# Patient Record
Sex: MALE | Race: WHITE | Employment: FULL TIME | URBAN - METROPOLITAN AREA
[De-identification: names, ages, dates, MRNs, and addresses within clinical notes are randomized per-mention and may not be internally consistent; named-entity substitution may affect disease eponyms.]

---

## 2019-10-10 ENCOUNTER — OFFICE VISIT (OUTPATIENT)
Dept: FAMILY MEDICINE CLINIC | Age: 29
End: 2019-10-10
Payer: MEDICAID

## 2019-10-10 VITALS
BODY MASS INDEX: 25.05 KG/M2 | OXYGEN SATURATION: 97 % | WEIGHT: 189 LBS | TEMPERATURE: 97.8 F | HEIGHT: 73 IN | SYSTOLIC BLOOD PRESSURE: 100 MMHG | DIASTOLIC BLOOD PRESSURE: 68 MMHG | HEART RATE: 85 BPM

## 2019-10-10 DIAGNOSIS — F41.8 SITUATIONAL ANXIETY: ICD-10-CM

## 2019-10-10 DIAGNOSIS — F42.9 OBSESSIVE-COMPULSIVE DISORDER, UNSPECIFIED TYPE: ICD-10-CM

## 2019-10-10 DIAGNOSIS — Z76.89 ENCOUNTER TO ESTABLISH CARE: Primary | ICD-10-CM

## 2019-10-10 DIAGNOSIS — Z76.0 MEDICATION REFILL: ICD-10-CM

## 2019-10-10 DIAGNOSIS — E10.69 TYPE 1 DIABETES MELLITUS WITH OTHER SPECIFIED COMPLICATION (HCC): ICD-10-CM

## 2019-10-10 PROCEDURE — 90472 IMMUNIZATION ADMIN EACH ADD: CPT | Performed by: FAMILY MEDICINE

## 2019-10-10 PROCEDURE — 99203 OFFICE O/P NEW LOW 30 MIN: CPT | Performed by: FAMILY MEDICINE

## 2019-10-10 PROCEDURE — 90686 IIV4 VACC NO PRSV 0.5 ML IM: CPT | Performed by: FAMILY MEDICINE

## 2019-10-10 PROCEDURE — 3046F HEMOGLOBIN A1C LEVEL >9.0%: CPT | Performed by: FAMILY MEDICINE

## 2019-10-10 PROCEDURE — 90715 TDAP VACCINE 7 YRS/> IM: CPT | Performed by: FAMILY MEDICINE

## 2019-10-10 PROCEDURE — 90471 IMMUNIZATION ADMIN: CPT | Performed by: FAMILY MEDICINE

## 2019-10-10 PROCEDURE — 4004F PT TOBACCO SCREEN RCVD TLK: CPT | Performed by: FAMILY MEDICINE

## 2019-10-10 PROCEDURE — G8427 DOCREV CUR MEDS BY ELIG CLIN: HCPCS | Performed by: FAMILY MEDICINE

## 2019-10-10 PROCEDURE — G8482 FLU IMMUNIZE ORDER/ADMIN: HCPCS | Performed by: FAMILY MEDICINE

## 2019-10-10 PROCEDURE — 2022F DILAT RTA XM EVC RTNOPTHY: CPT | Performed by: FAMILY MEDICINE

## 2019-10-10 PROCEDURE — G8420 CALC BMI NORM PARAMETERS: HCPCS | Performed by: FAMILY MEDICINE

## 2019-10-10 RX ORDER — INSULIN GLARGINE 100 [IU]/ML
40 INJECTION, SOLUTION SUBCUTANEOUS NIGHTLY
Refills: 0 | COMMUNITY
Start: 2019-09-26 | End: 2019-10-10 | Stop reason: SDUPTHER

## 2019-10-10 RX ORDER — INSULIN ASPART 100 [IU]/ML
1 INJECTION, SOLUTION INTRAVENOUS; SUBCUTANEOUS
COMMUNITY
Start: 2019-10-10 | End: 2019-10-10 | Stop reason: SDUPTHER

## 2019-10-10 RX ORDER — DIAPER,BRIEF,ADULT, DISPOSABLE
EACH MISCELLANEOUS
COMMUNITY
Start: 2019-09-16 | End: 2020-01-21 | Stop reason: SDUPTHER

## 2019-10-10 RX ORDER — PROPRANOLOL HYDROCHLORIDE 20 MG/1
20 TABLET ORAL DAILY PRN
Qty: 30 TABLET | Refills: 0 | Status: SHIPPED | OUTPATIENT
Start: 2019-10-10 | End: 2020-01-21

## 2019-10-10 RX ORDER — DULOXETIN HYDROCHLORIDE 30 MG/1
30 CAPSULE, DELAYED RELEASE ORAL DAILY
COMMUNITY
Start: 2019-09-19 | End: 2019-10-10

## 2019-10-10 RX ORDER — SERTRALINE HYDROCHLORIDE 25 MG/1
25 TABLET, FILM COATED ORAL DAILY
Qty: 7 TABLET | Refills: 0 | Status: SHIPPED | OUTPATIENT
Start: 2019-10-10 | End: 2020-01-21

## 2019-10-10 RX ORDER — PROPRANOLOL HYDROCHLORIDE 40 MG/1
40 TABLET ORAL DAILY PRN
Qty: 30 TABLET | Refills: 3 | Status: CANCELLED | OUTPATIENT
Start: 2019-10-10

## 2019-10-10 ASSESSMENT — PATIENT HEALTH QUESTIONNAIRE - PHQ9
SUM OF ALL RESPONSES TO PHQ9 QUESTIONS 1 & 2: 0
1. LITTLE INTEREST OR PLEASURE IN DOING THINGS: 0
SUM OF ALL RESPONSES TO PHQ QUESTIONS 1-9: 0
SUM OF ALL RESPONSES TO PHQ QUESTIONS 1-9: 0
2. FEELING DOWN, DEPRESSED OR HOPELESS: 0

## 2019-10-10 ASSESSMENT — ENCOUNTER SYMPTOMS
DIARRHEA: 1
BACK PAIN: 0
SHORTNESS OF BREATH: 0
COUGH: 0
WHEEZING: 0
ABDOMINAL PAIN: 0

## 2020-01-15 ENCOUNTER — TELEPHONE (OUTPATIENT)
Dept: FAMILY MEDICINE CLINIC | Age: 30
End: 2020-01-15

## 2020-01-15 NOTE — TELEPHONE ENCOUNTER
Pt left  msg saying that he is looking for Lantus in a vial or is asking if there is something cheaper the doctor can substitute since the patient doesn't have insurance.

## 2020-01-17 RX ORDER — INSULIN GLARGINE 100 [IU]/ML
40 INJECTION, SOLUTION SUBCUTANEOUS NIGHTLY
Qty: 5 VIAL | Refills: 2 | Status: SHIPPED | OUTPATIENT
Start: 2020-01-17 | End: 2020-01-21 | Stop reason: SDUPTHER

## 2020-01-21 ENCOUNTER — OFFICE VISIT (OUTPATIENT)
Dept: FAMILY MEDICINE CLINIC | Age: 30
End: 2020-01-21
Payer: COMMERCIAL

## 2020-01-21 ENCOUNTER — TELEPHONE (OUTPATIENT)
Dept: FAMILY MEDICINE CLINIC | Age: 30
End: 2020-01-21

## 2020-01-21 ENCOUNTER — HOSPITAL ENCOUNTER (OUTPATIENT)
Age: 30
Discharge: HOME | End: 2020-01-23
Payer: COMMERCIAL

## 2020-01-21 VITALS
BODY MASS INDEX: 25.31 KG/M2 | HEART RATE: 90 BPM | OXYGEN SATURATION: 98 % | TEMPERATURE: 97.6 F | DIASTOLIC BLOOD PRESSURE: 78 MMHG | WEIGHT: 191 LBS | HEIGHT: 73 IN | SYSTOLIC BLOOD PRESSURE: 122 MMHG

## 2020-01-21 LAB
ALBUMIN SERPL-MCNC: 4.4 G/DL (ref 3.5–5.2)
ALP BLD-CCNC: 66 U/L (ref 40–129)
ALT SERPL-CCNC: 11 U/L (ref 0–40)
ANION GAP SERPL CALCULATED.3IONS-SCNC: 19 MMOL/L (ref 7–16)
AST SERPL-CCNC: 13 U/L (ref 0–39)
BASOPHILS ABSOLUTE: 0.05 E9/L (ref 0–0.2)
BASOPHILS RELATIVE PERCENT: 0.6 % (ref 0–2)
BILIRUB SERPL-MCNC: 0.3 MG/DL (ref 0–1.2)
BUN BLDV-MCNC: 8 MG/DL (ref 6–20)
CALCIUM SERPL-MCNC: 9.6 MG/DL (ref 8.6–10.2)
CHLORIDE BLD-SCNC: 103 MMOL/L (ref 98–107)
CHOLESTEROL, TOTAL: 131 MG/DL (ref 0–199)
CO2: 20 MMOL/L (ref 22–29)
CREAT SERPL-MCNC: 0.7 MG/DL (ref 0.7–1.2)
EOSINOPHILS ABSOLUTE: 0.22 E9/L (ref 0.05–0.5)
EOSINOPHILS RELATIVE PERCENT: 2.5 % (ref 0–6)
GFR AFRICAN AMERICAN: >60
GFR NON-AFRICAN AMERICAN: >60 ML/MIN/1.73
GLUCOSE BLD-MCNC: 58 MG/DL (ref 74–99)
HBA1C MFR BLD: 7.7 % (ref 4–5.6)
HCT VFR BLD CALC: 46.2 % (ref 37–54)
HDLC SERPL-MCNC: 47 MG/DL
HEMOGLOBIN: 15.2 G/DL (ref 12.5–16.5)
IMMATURE GRANULOCYTES #: 0.02 E9/L
IMMATURE GRANULOCYTES %: 0.2 % (ref 0–5)
LDL CHOLESTEROL CALCULATED: 58 MG/DL (ref 0–99)
LYMPHOCYTES ABSOLUTE: 2.34 E9/L (ref 1.5–4)
LYMPHOCYTES RELATIVE PERCENT: 26.9 % (ref 20–42)
MCH RBC QN AUTO: 30.8 PG (ref 26–35)
MCHC RBC AUTO-ENTMCNC: 32.9 % (ref 32–34.5)
MCV RBC AUTO: 93.5 FL (ref 80–99.9)
MONOCYTES ABSOLUTE: 0.62 E9/L (ref 0.1–0.95)
MONOCYTES RELATIVE PERCENT: 7.1 % (ref 2–12)
NEUTROPHILS ABSOLUTE: 5.44 E9/L (ref 1.8–7.3)
NEUTROPHILS RELATIVE PERCENT: 62.7 % (ref 43–80)
PDW BLD-RTO: 12.2 FL (ref 11.5–15)
PLATELET # BLD: 353 E9/L (ref 130–450)
PMV BLD AUTO: 11 FL (ref 7–12)
POTASSIUM SERPL-SCNC: 4.1 MMOL/L (ref 3.5–5)
RBC # BLD: 4.94 E12/L (ref 3.8–5.8)
SODIUM BLD-SCNC: 142 MMOL/L (ref 132–146)
T4 FREE: 1.3 NG/DL (ref 0.93–1.7)
TOTAL PROTEIN: 7.4 G/DL (ref 6.4–8.3)
TRIGL SERPL-MCNC: 129 MG/DL (ref 0–149)
TSH SERPL DL<=0.05 MIU/L-ACNC: 0.93 UIU/ML (ref 0.27–4.2)
VLDLC SERPL CALC-MCNC: 26 MG/DL
WBC # BLD: 8.7 E9/L (ref 4.5–11.5)

## 2020-01-21 PROCEDURE — 84439 ASSAY OF FREE THYROXINE: CPT

## 2020-01-21 PROCEDURE — 99213 OFFICE O/P EST LOW 20 MIN: CPT | Performed by: FAMILY MEDICINE

## 2020-01-21 PROCEDURE — 85025 COMPLETE CBC W/AUTO DIFF WBC: CPT

## 2020-01-21 PROCEDURE — 80053 COMPREHEN METABOLIC PANEL: CPT

## 2020-01-21 PROCEDURE — 84443 ASSAY THYROID STIM HORMONE: CPT

## 2020-01-21 PROCEDURE — 80061 LIPID PANEL: CPT

## 2020-01-21 PROCEDURE — 36415 COLL VENOUS BLD VENIPUNCTURE: CPT

## 2020-01-21 PROCEDURE — 83036 HEMOGLOBIN GLYCOSYLATED A1C: CPT

## 2020-01-21 RX ORDER — BUSPIRONE HYDROCHLORIDE 10 MG/1
10 TABLET ORAL 3 TIMES DAILY
Qty: 90 TABLET | Refills: 0 | Status: SHIPPED
Start: 2020-01-21 | End: 2020-02-25 | Stop reason: SDUPTHER

## 2020-01-21 RX ORDER — GLUCOSAMINE HCL/CHONDROITIN SU 500-400 MG
CAPSULE ORAL
Qty: 100 STRIP | Refills: 5 | Status: SHIPPED
Start: 2020-01-21 | End: 2020-02-06 | Stop reason: SDUPTHER

## 2020-01-21 RX ORDER — BLOOD-GLUCOSE METER
1 KIT MISCELLANEOUS DAILY
Qty: 1 KIT | Refills: 0 | Status: SHIPPED | OUTPATIENT
Start: 2020-01-21

## 2020-01-21 ASSESSMENT — PATIENT HEALTH QUESTIONNAIRE - PHQ9
SUM OF ALL RESPONSES TO PHQ QUESTIONS 1-9: 0
SUM OF ALL RESPONSES TO PHQ QUESTIONS 1-9: 0
SUM OF ALL RESPONSES TO PHQ9 QUESTIONS 1 & 2: 0
2. FEELING DOWN, DEPRESSED OR HOPELESS: 0
1. LITTLE INTEREST OR PLEASURE IN DOING THINGS: 0

## 2020-01-21 ASSESSMENT — ENCOUNTER SYMPTOMS
SHORTNESS OF BREATH: 0
BACK PAIN: 0
WHEEZING: 0
ABDOMINAL PAIN: 0
COUGH: 0

## 2020-01-21 NOTE — TELEPHONE ENCOUNTER
Pharmacy called saying they'll need maximum daily dose for insurance--for his novolog flex pen. Please advise.

## 2020-02-06 ENCOUNTER — OFFICE VISIT (OUTPATIENT)
Dept: FAMILY MEDICINE CLINIC | Age: 30
End: 2020-02-06
Payer: COMMERCIAL

## 2020-02-06 VITALS
BODY MASS INDEX: 25.58 KG/M2 | SYSTOLIC BLOOD PRESSURE: 116 MMHG | HEART RATE: 71 BPM | HEIGHT: 73 IN | OXYGEN SATURATION: 98 % | DIASTOLIC BLOOD PRESSURE: 70 MMHG | WEIGHT: 193 LBS | TEMPERATURE: 97.5 F

## 2020-02-06 PROBLEM — F41.9 ANXIETY AND DEPRESSION: Status: ACTIVE | Noted: 2020-02-06

## 2020-02-06 PROBLEM — Z76.0 MEDICATION REFILL: Status: ACTIVE | Noted: 2020-02-06

## 2020-02-06 PROBLEM — F32.A ANXIETY AND DEPRESSION: Status: ACTIVE | Noted: 2020-02-06

## 2020-02-06 PROBLEM — E10.69 TYPE 1 DIABETES MELLITUS WITH OTHER SPECIFIED COMPLICATION (HCC): Status: ACTIVE | Noted: 2020-02-06

## 2020-02-06 PROCEDURE — 99213 OFFICE O/P EST LOW 20 MIN: CPT | Performed by: FAMILY MEDICINE

## 2020-02-06 PROCEDURE — 3051F HG A1C>EQUAL 7.0%<8.0%: CPT | Performed by: FAMILY MEDICINE

## 2020-02-06 RX ORDER — GLUCOSAMINE HCL/CHONDROITIN SU 500-400 MG
CAPSULE ORAL
Qty: 100 STRIP | Refills: 5 | Status: SHIPPED
Start: 2020-02-06 | End: 2020-02-25 | Stop reason: SDUPTHER

## 2020-02-06 ASSESSMENT — ENCOUNTER SYMPTOMS
SHORTNESS OF BREATH: 0
BACK PAIN: 0
COUGH: 0
ABDOMINAL PAIN: 0
WHEEZING: 0

## 2020-02-06 NOTE — PROGRESS NOTES
Deandra Batres   Patient is a 30y.o. year old male who presents with:  Chief Complaint   Patient presents with    Follow-up   [address]     Completed on 1-     Patient also follows with: none    HPI    DM type I  Current treatment: lantus 40 units nightly, insulin aspart with meals 1unit/10g carbohydrates    Recent changes in medications: none  Estimates he uses an average of 25 units of novolog per day. The patient checks home blood glucose: four times per day  Recent blood glucose readings: poorly controlled and labile. Lab Results   Component Value Date    LABA1C 7.7 (H) 01/21/2020       OCD/situational anxiety  Current treatment BuSpar 10 mg 3 times daily  Recent changes in medication: Current treatment was started last visit  Reports symptoms were significantly improved the first 1-2 weeks after beginning treatment  Improvement has waned to some degree but remain controlled  Denies problems with the medication  It was recommended he establish with psychiatry after last visit, he has not done so but plans to in the future    Review of Systems   Constitutional: Positive for fatigue. Negative for chills, fever and unexpected weight change. Respiratory: Negative for cough, shortness of breath and wheezing. Cardiovascular: Negative for chest pain, palpitations and leg swelling. Gastrointestinal: Negative for abdominal pain. Genitourinary: Negative for dysuria and frequency. Musculoskeletal: Negative for arthralgias, back pain and neck pain. Neurological: Negative for weakness, numbness and headaches. Psychiatric/Behavioral: Positive for dysphoric mood. The patient is nervous/anxious.         Health Maintenance Due   Topic Date Due    Varicella vaccine (1 of 2 - 2-dose childhood series) 03/21/1992    Pneumococcal 0-64 years Vaccine (1 of 1 - PPSV23) 03/21/1997    Diabetic foot exam  03/21/2001    Diabetic retinal exam  03/21/2001    HIV screen  03/21/2006    Diabetic and time. Psychiatric:         Attention and Perception: Attention normal.         Mood and Affect: Mood normal.         Speech: Speech normal.         Behavior: Behavior normal.         Thought Content: Thought content normal.       ASSESSMENT AND PLAN    1. Type 1 diabetes mellitus with other specified complication (HCC)  Poorly controlled. Basal insulin is currently making up approximately two thirds of his totally daily insulin. Admits that he has to eat sugar as a result of low blood glucose readings somewhat regularly. Decrease basal insulin from 40 units nightly to 30 units nightly. Increase prandial insulin from 1 unit per 10 g of carbohydrates to 1.5 unit per 10 g of carbohydrates. Return in 1 month for blood glucose log review. - insulin glargine (LANTUS SOLOSTAR) 100 UNIT/ML injection pen; Inject 30 Units into the skin nightly  Dispense: 5 pen; Refill: 3  - insulin aspart (NOVOLOG FLEXPEN) 100 UNIT/ML injection pen; Inject three times daily with meals. Inject 1.5 units per 10g carbohydrates consumed  Dispense: 5 pen; Refill: 3    2. Anxiety and depression  Controlled, continue current treatment  Plans to establish with psychiatry in the future once DM is better controlled. 3. Medication refill  - blood glucose monitor strips; Test four times a day & as needed for symptoms of irregular blood glucose. Dispense: 100 strip; Refill: 5  - insulin glargine (LANTUS SOLOSTAR) 100 UNIT/ML injection pen; Inject 30 Units into the skin nightly  Dispense: 5 pen; Refill: 3    Return in about 1 month (around 3/6/2020) for blood glucose log review. Michelle Edwards DO  02/06/20  3:18 PM    There are no Patient Instructions on file for this visit.

## 2020-02-21 ENCOUNTER — OFFICE VISIT (OUTPATIENT)
Dept: FAMILY MEDICINE CLINIC | Age: 30
End: 2020-02-21
Payer: COMMERCIAL

## 2020-02-21 VITALS
DIASTOLIC BLOOD PRESSURE: 80 MMHG | TEMPERATURE: 98.1 F | SYSTOLIC BLOOD PRESSURE: 108 MMHG | WEIGHT: 189 LBS | OXYGEN SATURATION: 98 % | HEART RATE: 84 BPM | BODY MASS INDEX: 25.05 KG/M2 | HEIGHT: 73 IN

## 2020-02-21 PROBLEM — A63.0 GENITAL WARTS: Status: ACTIVE | Noted: 2020-02-21

## 2020-02-21 PROCEDURE — 99213 OFFICE O/P EST LOW 20 MIN: CPT | Performed by: FAMILY MEDICINE

## 2020-02-21 PROCEDURE — 54056 CRYOSURGERY PENIS LESION(S): CPT | Performed by: FAMILY MEDICINE

## 2020-02-21 ASSESSMENT — ENCOUNTER SYMPTOMS: SHORTNESS OF BREATH: 0

## 2020-02-21 NOTE — PROGRESS NOTES
6' 1\" (1.854 m)   Wt 189 lb (85.7 kg)   SpO2 98%   BMI 24.94 kg/m²     Wt Readings from Last 3 Encounters:   02/21/20 189 lb (85.7 kg)   02/06/20 193 lb (87.5 kg)   01/21/20 191 lb (86.6 kg)       Physical Exam  Constitutional:       General: He is not in acute distress. HENT:      Head: Normocephalic and atraumatic. Cardiovascular:      Rate and Rhythm: Normal rate and regular rhythm. Pulmonary:      Effort: Pulmonary effort is normal.      Breath sounds: Normal breath sounds. Abdominal:      Palpations: Abdomen is soft. Tenderness: There is no abdominal tenderness. There is no guarding. Skin:     General: Skin is warm and dry. Comments: Total of six similar lesions located at the shaft and base of the penis. Five measuring 5-8mm diameter, one measuring 1mm diameter. All circular, raised, verrucous appearance. No ulceration, bleeding. No erythema or vesicles, blisters. Neurological:      Mental Status: He is alert. Cryotherapy Procedure Note    Pre-operative Diagnosis: genital warts    Anesthesia: not required     Patient informed of risks (permanent scarring, infection, light or dark discoloration, bleeding, infection, weakness, numbness and recurrence of the lesion) and benefits of the procedure and verbal informed consent obtained. The areas are treated with cryotherapy, frozen 30 seconds. The patient tolerated procedure well. The patient was instructed on post-op care, warned that there may be blister formation, redness and pain. Recommend OTC analgesia as needed for pain. Condition:  Stable    Complications:  none. ASSESSMENT AND PLAN    1. Genital warts  - AR DESTR PENIS LESN,ELICEO,CRYOSURG  1. Instructed to keep the area dry and covered for 24-48h and clean thereafter. 2. Warning signs of infection were reviewed. 3. Recommended that the patient use OTC acetaminophen, OTC ibuprofen as needed for pain.    4. Return in 3 weeks for re-treatment if still present. Provided with relevant printed material.    Return for follow-up from previous visit, or sooner as needed. Mono DO Olivier  02/21/20  1:35 PM    Patient Instructions     Patient Education        Genital Warts: Care Instructions  Your Care Instructions  Genital warts are caused by a virus called the human papillomavirus (HPV). They are considered a sexually transmitted infection (STI) because the virus can be spread by sexual contact. The warts often look like small, fleshy bumps or flat, white patches. They can be anywhere in the genital area. You can also be infected with HPV yet not have visible warts. Genital warts often go away on their own without treatment. Some people decide to treat them because of the symptoms or the warts' appearance. Follow-up care is a key part of your treatment and safety. Be sure to make and go to all appointments, and call your doctor if you are having problems. It's also a good idea to know your test results and keep a list of the medicines you take. How can you care for yourself at home? · If your doctor gave you medicine to treat your warts at home, use the medicine exactly as prescribed. Call your doctor if you think you are having a problem with your medicine. · To reduce the itching and irritation from genital warts:  ? Take warm baths or wash the area with warm water 3 or 4 times a day. ? Keep the warts clean and dry in between baths. You may want to let the sores air dry. This may feel better than a towel. ? Do not use over-the-counter wart removal products to treat genital warts. These products are not intended for the genital area and may cause serious burns. To prevent the spread of genital warts  · Be sure to use a latex condom every time you have sex. You can infect someone even if you do not have an obvious wart. · Having one sex partner (who does not have STIs and does not have sex with anyone else) is a good way to avoid STIs.   · Wash your

## 2020-02-21 NOTE — PATIENT INSTRUCTIONS
genital wart hurts or spreads.    Watch closely for changes in your health, and be sure to contact your doctor if:    · You want further treatment for your genital warts.     · You do not get better as expected. Where can you learn more? Go to https://chpejoeeb.[employer]. org and sign in to your [employer] account. Enter F579 in the [employer] box to learn more about \"Genital Warts: Care Instructions. \"     If you do not have an account, please click on the \"Sign Up Now\" link. Current as of: July 7, 2019  Content Version: 12.3  © 3959-5748 [employer]wise, Incorporated. Care instructions adapted under license by [facility] ([facility]). If you have questions about a medical condition or this instruction, always ask your healthcare professional. [address] any warranty or liability for your use of this information.

## 2020-02-25 RX ORDER — GLUCOSAMINE HCL/CHONDROITIN SU 500-400 MG
CAPSULE ORAL
Qty: 800 STRIP | Refills: 5 | Status: SHIPPED | OUTPATIENT
Start: 2020-02-25

## 2020-02-25 RX ORDER — BUSPIRONE HYDROCHLORIDE 10 MG/1
10 TABLET ORAL 3 TIMES DAILY
Qty: 90 TABLET | Refills: 0 | Status: SHIPPED
Start: 2020-02-25 | End: 2020-03-06

## 2020-02-25 NOTE — TELEPHONE ENCOUNTER
Patient informed. I also let him know that the increase in the test strips may not go through.   He will find out when it is ran through insurance

## 2020-02-25 NOTE — TELEPHONE ENCOUNTER
Last Appointment   2/21/2020  Next Appointment  Visit date not found      Patient states he needs more test strips than 100. He said he may test up to 8 times a day.

## 2020-03-05 ENCOUNTER — TELEPHONE (OUTPATIENT)
Dept: FAMILY MEDICINE CLINIC | Age: 30
End: 2020-03-05

## 2020-03-06 ENCOUNTER — OFFICE VISIT (OUTPATIENT)
Dept: FAMILY MEDICINE CLINIC | Age: 30
End: 2020-03-06
Payer: COMMERCIAL

## 2020-03-06 VITALS
RESPIRATION RATE: 20 BRPM | HEIGHT: 73 IN | BODY MASS INDEX: 25.71 KG/M2 | WEIGHT: 194 LBS | HEART RATE: 85 BPM | DIASTOLIC BLOOD PRESSURE: 74 MMHG | TEMPERATURE: 98.1 F | OXYGEN SATURATION: 97 % | SYSTOLIC BLOOD PRESSURE: 102 MMHG

## 2020-03-06 PROCEDURE — 3051F HG A1C>EQUAL 7.0%<8.0%: CPT | Performed by: FAMILY MEDICINE

## 2020-03-06 PROCEDURE — 99213 OFFICE O/P EST LOW 20 MIN: CPT | Performed by: FAMILY MEDICINE

## 2020-03-06 RX ORDER — BUSPIRONE HYDROCHLORIDE 15 MG/1
15 TABLET ORAL 3 TIMES DAILY
Qty: 90 TABLET | Refills: 2 | Status: SHIPPED
Start: 2020-03-06 | End: 2020-06-12 | Stop reason: ALTCHOICE

## 2020-03-06 ASSESSMENT — ENCOUNTER SYMPTOMS
WHEEZING: 0
SHORTNESS OF BREATH: 0
ABDOMINAL PAIN: 0
COUGH: 0
BACK PAIN: 0

## 2020-03-06 NOTE — PROGRESS NOTES
Naveed Ayala   Patient is a 30y.o. year old male who presents with:  Chief Complaint   Patient presents with    Diabetes     Patient wants to discuss getting an insulin pump    Discuss Medications     Asking for an adjustment to buspirone. Asking for generic substitution for novolog      Patient also follows with: none    HPI    DM type I  Current treatment: lantus 30 units nightly, insulin aspart with meals 1.5unit/10g carbohydrates   Recent changes in medications: decreased lantus from 40 units and increased aspart from 1unit/10g carbohydrates  Change was made d/t lability of readings and hypoglycemia  Estimates he uses an average of 30 units of aspart per day. The patient checks home blood glucose: eight times per day  Recent blood glucose readings: poorly controlled. Patient feels anxiety is interfering with DM control, checks BG whenever he feels anxiety, takes aspart and eats in an effort to help improve BG   Lab Results   Component Value Date    LABA1C 7.7 (H) 01/21/2020     OCD/situational anxiety  Current treatment BuSpar 10 mg 3 times daily  Recent changes in medication: none  Reports symptoms were significantly improved the first 1-2 weeks after beginning treatment  Improvement has waned to some degree but remain controlled  Denies problems with the medication  It was recommended he establish with psychiatry at past visits, has not scheduled an appt but again state he plans to do so but feels overwhelmed by everything. Review of Systems   Constitutional: Positive for fatigue. Negative for chills, fever and unexpected weight change. Respiratory: Negative for cough, shortness of breath and wheezing. Cardiovascular: Negative for chest pain, palpitations and leg swelling. Gastrointestinal: Negative for abdominal pain. Genitourinary: Negative for dysuria and frequency. Musculoskeletal: Negative for arthralgias, back pain and neck pain.    Neurological: Negative for weakness, numbness Single     Spouse name: None    Number of children: None    Years of education: None    Highest education level: None   Occupational History    None   Social Needs    Financial resource strain: None    Food insecurity     Worry: None     Inability: None    Transportation needs     Medical: None     Non-medical: None   Tobacco Use    Smoking status: Current Every Day Smoker     Packs/day: 1.00     Years: 13.00     Pack years: 13.00     Types: Cigarettes     Start date: 10/10/2006    Smokeless tobacco: Never Used   Substance and Sexual Activity    Alcohol use: Not Currently     Comment: Social     Drug use: Not Currently     Types: Methamphetamines     Comment: Patient was at a treatment facility for 1 month for addiction treatment     Sexual activity: Yes     Partners: Female   Lifestyle    Physical activity     Days per week: None     Minutes per session: None    Stress: None   Relationships    Social connections     Talks on phone: None     Gets together: None     Attends L[employer] service: None     Active member of club or organization: None     Attends meetings of clubs or organizations: None     Relationship status: None    Intimate partner violence     Fear of current or ex partner: None     Emotionally abused: None     Physically abused: None     Forced sexual activity: None   Other Topics Concern    None   Social History Narrative    None       OBJECTIVE    /74   Pulse 85   Temp 98.1 °F (36.7 °C) (Oral)   Resp 20   Ht 6' 1\" (1.854 m)   Wt 194 lb (88 kg)   SpO2 97%   BMI 25.60 kg/m²     Wt Readings from Last 3 Encounters:   03/06/20 194 lb (88 kg)   02/21/20 189 lb (85.7 kg)   02/06/20 193 lb (87.5 kg)       Physical Exam  Constitutional:       General: He is not in acute distress. HENT:      Head: Normocephalic and atraumatic. Eyes:      Conjunctiva/sclera: Conjunctivae normal.   Neck:      Musculoskeletal: Neck supple.    Cardiovascular:      Rate and Rhythm: Normal rate and

## 2020-03-12 ENCOUNTER — TELEPHONE (OUTPATIENT)
Dept: FAMILY MEDICINE CLINIC | Age: 30
End: 2020-03-12

## 2020-07-06 ENCOUNTER — HOSPITAL ENCOUNTER (OUTPATIENT)
Age: 30
Discharge: HOME | End: 2020-07-08
Payer: COMMERCIAL

## 2020-07-06 LAB
ALBUMIN SERPL-MCNC: 4.3 G/DL (ref 3.5–5.2)
ALP BLD-CCNC: 62 U/L (ref 40–129)
ALT SERPL-CCNC: 11 U/L (ref 0–40)
ANION GAP SERPL CALCULATED.3IONS-SCNC: 12 MMOL/L (ref 7–16)
AST SERPL-CCNC: 13 U/L (ref 0–39)
BASOPHILS ABSOLUTE: 0.05 E9/L (ref 0–0.2)
BASOPHILS RELATIVE PERCENT: 0.5 % (ref 0–2)
BILIRUB SERPL-MCNC: <0.2 MG/DL (ref 0–1.2)
BUN BLDV-MCNC: 10 MG/DL (ref 6–20)
CALCIUM SERPL-MCNC: 9.4 MG/DL (ref 8.6–10.2)
CHLORIDE BLD-SCNC: 102 MMOL/L (ref 98–107)
CO2: 25 MMOL/L (ref 22–29)
CREAT SERPL-MCNC: 0.9 MG/DL (ref 0.7–1.2)
EOSINOPHILS ABSOLUTE: 0.12 E9/L (ref 0.05–0.5)
EOSINOPHILS RELATIVE PERCENT: 1.3 % (ref 0–6)
GFR AFRICAN AMERICAN: >60
GFR NON-AFRICAN AMERICAN: >60 ML/MIN/1.73
GLUCOSE BLD-MCNC: 228 MG/DL (ref 74–99)
HBA1C MFR BLD: 7.6 % (ref 4–5.6)
HCT VFR BLD CALC: 47.1 % (ref 37–54)
HEMOGLOBIN: 15.3 G/DL (ref 12.5–16.5)
IMMATURE GRANULOCYTES #: 0.03 E9/L
IMMATURE GRANULOCYTES %: 0.3 % (ref 0–5)
LYMPHOCYTES ABSOLUTE: 1.55 E9/L (ref 1.5–4)
LYMPHOCYTES RELATIVE PERCENT: 16.4 % (ref 20–42)
MCH RBC QN AUTO: 30.7 PG (ref 26–35)
MCHC RBC AUTO-ENTMCNC: 32.5 % (ref 32–34.5)
MCV RBC AUTO: 94.6 FL (ref 80–99.9)
MONOCYTES ABSOLUTE: 0.43 E9/L (ref 0.1–0.95)
MONOCYTES RELATIVE PERCENT: 4.5 % (ref 2–12)
NEUTROPHILS ABSOLUTE: 7.3 E9/L (ref 1.8–7.3)
NEUTROPHILS RELATIVE PERCENT: 77 % (ref 43–80)
PDW BLD-RTO: 13.3 FL (ref 11.5–15)
PLATELET # BLD: 330 E9/L (ref 130–450)
PMV BLD AUTO: 11.3 FL (ref 7–12)
POTASSIUM SERPL-SCNC: 4.7 MMOL/L (ref 3.5–5)
RBC # BLD: 4.98 E12/L (ref 3.8–5.8)
SODIUM BLD-SCNC: 139 MMOL/L (ref 132–146)
TOTAL PROTEIN: 6.9 G/DL (ref 6.4–8.3)
WBC # BLD: 9.5 E9/L (ref 4.5–11.5)

## 2020-07-06 PROCEDURE — 83036 HEMOGLOBIN GLYCOSYLATED A1C: CPT

## 2020-07-06 PROCEDURE — 85025 COMPLETE CBC W/AUTO DIFF WBC: CPT

## 2020-07-06 PROCEDURE — 80053 COMPREHEN METABOLIC PANEL: CPT

## 2023-06-01 ENCOUNTER — OFFICE VISIT (OUTPATIENT)
Dept: PRIMARY CARE | Facility: CLINIC | Age: 33
End: 2023-06-01
Payer: COMMERCIAL

## 2023-06-01 VITALS
SYSTOLIC BLOOD PRESSURE: 126 MMHG | HEIGHT: 72 IN | DIASTOLIC BLOOD PRESSURE: 82 MMHG | BODY MASS INDEX: 25.73 KG/M2 | WEIGHT: 190 LBS

## 2023-06-01 DIAGNOSIS — F41.9 ANXIETY AND DEPRESSION: ICD-10-CM

## 2023-06-01 DIAGNOSIS — R53.83 OTHER FATIGUE: ICD-10-CM

## 2023-06-01 DIAGNOSIS — E13.9 OTHER SPECIFIED DIABETES MELLITUS WITHOUT COMPLICATION, WITHOUT LONG-TERM CURRENT USE OF INSULIN (MULTI): ICD-10-CM

## 2023-06-01 DIAGNOSIS — F32.A ANXIETY AND DEPRESSION: ICD-10-CM

## 2023-06-01 DIAGNOSIS — Z13.220 LIPID SCREENING: ICD-10-CM

## 2023-06-01 DIAGNOSIS — E55.9 VITAMIN D DEFICIENCY: ICD-10-CM

## 2023-06-01 PROBLEM — E11.9 DIABETES MELLITUS (MULTI): Status: ACTIVE | Noted: 2023-06-01

## 2023-06-01 PROBLEM — M47.812 SPONDYLOSIS OF CERVICAL SPINE: Status: ACTIVE | Noted: 2023-02-28

## 2023-06-01 PROBLEM — F12.90 MARIJUANA USER: Status: ACTIVE | Noted: 2021-10-12

## 2023-06-01 PROCEDURE — 99214 OFFICE O/P EST MOD 30 MIN: CPT | Performed by: INTERNAL MEDICINE

## 2023-06-01 PROCEDURE — 3079F DIAST BP 80-89 MM HG: CPT | Performed by: INTERNAL MEDICINE

## 2023-06-01 PROCEDURE — 3074F SYST BP LT 130 MM HG: CPT | Performed by: INTERNAL MEDICINE

## 2023-06-01 RX ORDER — BLOOD-GLUCOSE SENSOR
EACH MISCELLANEOUS
Qty: 1 EACH | Refills: 3 | Status: SHIPPED | OUTPATIENT
Start: 2023-06-01 | End: 2023-08-21 | Stop reason: SDUPTHER

## 2023-06-01 RX ORDER — INSULIN GLARGINE 100 [IU]/ML
INJECTION, SOLUTION SUBCUTANEOUS
COMMUNITY
End: 2023-07-25 | Stop reason: SDUPTHER

## 2023-06-01 RX ORDER — ARIPIPRAZOLE 15 MG/1
15 TABLET ORAL DAILY
COMMUNITY

## 2023-06-01 RX ORDER — INSULIN ASPART 100 [IU]/ML
INJECTION, SOLUTION INTRAVENOUS; SUBCUTANEOUS
COMMUNITY
End: 2023-07-25 | Stop reason: SDUPTHER

## 2023-06-01 RX ORDER — BLOOD-GLUCOSE METER
EACH MISCELLANEOUS
COMMUNITY
Start: 2022-09-15

## 2023-06-01 RX ORDER — BUPROPION HYDROCHLORIDE 300 MG/1
300 TABLET ORAL
COMMUNITY
Start: 2023-02-02

## 2023-06-01 RX ORDER — CALCIUM CITRATE/VITAMIN D3 200MG-6.25
1 TABLET ORAL 4 TIMES DAILY
COMMUNITY
Start: 2022-11-04

## 2023-06-01 NOTE — PROGRESS NOTES
OFFICE NOTE    NAME OF THE PATIENT: Elmer Little Jr    YOB: 1990    CHIEF COMPLAINT:  This gentleman today came here for multiple medical issues.  He wants to reestablish for diabetes.  He got insurance back.  He is looking for new psychiatric help in school, he was treated for ADD and could not ______ open.  He likes a little night drug.  He wants to go back on it.  He is back to work now.  His day-to-day work is difficult.  He wants some help there.    PAST MEDICAL HISTORY:  Reviewed on EMR, unchanged.    CURRENT MEDICATIONS:  Reviewed on EMR, unchanged.  He is on Lantus Humalog, Wellbutrin and     abilify.    ALLERGIES:  Reviewed on EMR, unchanged.    SOCIAL HISTORY:  Reviewed on EMR, unchanged.  He does not smoke and does not drink alcohol.    FAMILY HISTORY:  Reviewed on EMR, unchanged.    REVIEW OF SYSTEMS:  All 12 systems reviewed and pertaining covered in history and physical.    PHYSICAL EXAMINATION  VITAL SIGNS:  As recorded and reviewed from EMR.  RESPIRATORY:  The patient had normal inspirations and expirations.  The breath sounds were equal bilaterally and clear to auscultation.  CARDIOVASCULAR:  The patient had S1 normal, split S2 without obvious rubs, clicks, or murmurs.    GASTROINTESTINAL:  There was no hepatosplenomegaly.  There were no palpable masses and no inguinal nodes.  EXTREMITIES:  Legs had no edema.  NEUROLOGIC:  The patient had normal cranial nerves.  The reflexes, sensory, and motor examination were grossly within normal limits.    LAB WORK:  Laboratory testing discussed.    ASSESSMENT AND PLAN:  Type 2 diabetes, on insulin.  Regular with eye checkup.  We will check hemoglobin A1c.  Hypertension, okay.  High cholesterol.  Monitor.  Anxiety and depression.  He does not think Wellbutrin and abilify helping.  He is going to taper off.  ADD.  Currently a problem.  I refer him to the [place].  Sugar monitoring.  Too many times he checks sugar.  Sugar is labile.  He is very  young man.  He will benefit from continuous sugar monitoring.  I gave him a sample of Free Style.  He is technically savvy. He can use it over the phone.  I will continue to follow.      Kindly review this note in conjunction with EMR.     Subjective   Patient ID: Elmer Little  is a 33 y.o. male who presents for Follow-up.      HPI    Review of Systems    Objective   /82   Ht 1.829 m (6')   Wt 86.2 kg (190 lb)   BMI 25.77 kg/m²       Physical Exam    Assessment/Plan   Problem List Items Addressed This Visit    None

## 2023-06-03 DIAGNOSIS — E13.9 OTHER SPECIFIED DIABETES MELLITUS WITHOUT COMPLICATION, WITHOUT LONG-TERM CURRENT USE OF INSULIN (MULTI): ICD-10-CM

## 2023-06-04 RX ORDER — INSULIN PUMP SYRINGE, 3 ML
EACH MISCELLANEOUS
Qty: 1 KIT | Refills: 3 | Status: SHIPPED | OUTPATIENT
Start: 2023-06-04 | End: 2023-06-05

## 2023-06-05 DIAGNOSIS — E13.9 OTHER SPECIFIED DIABETES MELLITUS WITHOUT COMPLICATION, WITHOUT LONG-TERM CURRENT USE OF INSULIN (MULTI): ICD-10-CM

## 2023-06-05 RX ORDER — INSULIN PUMP SYRINGE, 3 ML
EACH MISCELLANEOUS
Qty: 1 KIT | Refills: 3 | Status: SHIPPED | OUTPATIENT
Start: 2023-06-05

## 2023-07-25 DIAGNOSIS — E13.9 OTHER SPECIFIED DIABETES MELLITUS WITHOUT COMPLICATION, WITHOUT LONG-TERM CURRENT USE OF INSULIN (MULTI): ICD-10-CM

## 2023-07-25 RX ORDER — INSULIN GLARGINE 100 [IU]/ML
INJECTION, SOLUTION SUBCUTANEOUS
Qty: 3 ML | Refills: 3 | Status: SHIPPED | OUTPATIENT
Start: 2023-07-25 | End: 2023-08-21 | Stop reason: SDUPTHER

## 2023-07-25 RX ORDER — INSULIN ASPART 100 [IU]/ML
INJECTION, SOLUTION INTRAVENOUS; SUBCUTANEOUS
Qty: 10 ML | Refills: 2 | Status: SHIPPED | OUTPATIENT
Start: 2023-07-25

## 2023-08-21 DIAGNOSIS — E13.9 OTHER SPECIFIED DIABETES MELLITUS WITHOUT COMPLICATION, WITHOUT LONG-TERM CURRENT USE OF INSULIN (MULTI): ICD-10-CM

## 2023-08-21 RX ORDER — IBUPROFEN 800 MG/1
800 TABLET ORAL 3 TIMES DAILY PRN
COMMUNITY

## 2023-08-21 RX ORDER — INSULIN LISPRO 100 [IU]/ML
INJECTION, SOLUTION INTRAVENOUS; SUBCUTANEOUS
COMMUNITY

## 2023-08-21 RX ORDER — GUANFACINE 1 MG/1
1 TABLET, EXTENDED RELEASE ORAL DAILY
COMMUNITY
Start: 2023-03-02

## 2023-08-21 RX ORDER — FLUOXETINE 10 MG/1
10 CAPSULE ORAL DAILY
COMMUNITY
Start: 2022-08-29

## 2023-08-21 RX ORDER — PEN NEEDLE, DIABETIC 31 GX5/16"
NEEDLE, DISPOSABLE MISCELLANEOUS
COMMUNITY
Start: 2023-06-26

## 2023-08-21 RX ORDER — SERTRALINE HYDROCHLORIDE 100 MG/1
100 TABLET, FILM COATED ORAL
COMMUNITY
Start: 2018-06-18

## 2023-08-21 RX ORDER — HYDROXYZINE PAMOATE 50 MG/1
1 CAPSULE ORAL 3 TIMES DAILY PRN
COMMUNITY
Start: 2023-03-27

## 2023-08-21 RX ORDER — FLUOXETINE HYDROCHLORIDE 20 MG/1
20 CAPSULE ORAL DAILY
COMMUNITY
Start: 2022-08-29

## 2023-08-21 RX ORDER — IBUPROFEN 600 MG/1
600 TABLET ORAL EVERY 6 HOURS PRN
COMMUNITY
Start: 2023-03-01

## 2023-08-21 RX ORDER — ACETAMINOPHEN 500 MG
1 TABLET ORAL DAILY
COMMUNITY
Start: 2021-06-25

## 2023-08-21 RX ORDER — NALOXONE HYDROCHLORIDE 4 MG/.1ML
4 SPRAY NASAL
COMMUNITY
Start: 2021-10-21 | End: 2024-08-01

## 2023-08-21 RX ORDER — INSULIN LISPRO 100 [IU]/ML
6 INJECTION, SOLUTION INTRAVENOUS; SUBCUTANEOUS
COMMUNITY
Start: 2022-11-28 | End: 2023-08-21 | Stop reason: SDUPTHER

## 2023-08-21 RX ORDER — TRAZODONE HYDROCHLORIDE 50 MG/1
50 TABLET ORAL NIGHTLY
COMMUNITY

## 2023-08-21 RX ORDER — ONDANSETRON HYDROCHLORIDE 8 MG/1
TABLET, FILM COATED ORAL 2 TIMES DAILY
COMMUNITY

## 2023-08-21 RX ORDER — LANCETS 33 GAUGE
EACH MISCELLANEOUS
COMMUNITY
Start: 2023-03-02

## 2023-08-21 RX ORDER — VENLAFAXINE HYDROCHLORIDE 75 MG/1
CAPSULE, EXTENDED RELEASE ORAL EVERY 24 HOURS
COMMUNITY

## 2023-08-21 RX ORDER — INSULIN LISPRO 100 [IU]/ML
INJECTION, SOLUTION INTRAVENOUS; SUBCUTANEOUS
COMMUNITY
Start: 2016-11-30

## 2023-08-21 RX ORDER — DIAZEPAM 5 MG/1
TABLET ORAL
COMMUNITY
Start: 2023-03-14

## 2023-08-21 RX ORDER — CLONIDINE HYDROCHLORIDE 0.1 MG/1
TABLET ORAL
COMMUNITY
Start: 2019-01-17

## 2023-08-22 RX ORDER — BLOOD-GLUCOSE SENSOR
EACH MISCELLANEOUS
Qty: 1 EACH | Refills: 3 | Status: SHIPPED | OUTPATIENT
Start: 2023-08-22

## 2023-08-22 RX ORDER — INSULIN GLARGINE 100 [IU]/ML
INJECTION, SOLUTION SUBCUTANEOUS
Qty: 3 ML | Refills: 3 | Status: SHIPPED | OUTPATIENT
Start: 2023-08-22

## 2023-08-22 RX ORDER — INSULIN LISPRO 100 [IU]/ML
6 INJECTION, SOLUTION INTRAVENOUS; SUBCUTANEOUS
Qty: 10 ML | Refills: 3 | Status: SHIPPED | OUTPATIENT
Start: 2023-08-22

## 2024-03-14 DIAGNOSIS — E13.9 OTHER SPECIFIED DIABETES MELLITUS WITHOUT COMPLICATION, WITHOUT LONG-TERM CURRENT USE OF INSULIN (MULTI): ICD-10-CM

## 2024-03-14 RX ORDER — BLOOD-GLUCOSE SENSOR
EACH MISCELLANEOUS
Refills: 1 | OUTPATIENT
Start: 2024-03-14

## 2024-09-24 ENCOUNTER — TELEPHONE (OUTPATIENT)
Dept: PRIMARY CARE | Facility: CLINIC | Age: 34
End: 2024-09-24
Payer: COMMERCIAL

## 2024-10-14 ENCOUNTER — APPOINTMENT (OUTPATIENT)
Dept: PRIMARY CARE | Facility: CLINIC | Age: 34
End: 2024-10-14
Payer: COMMERCIAL

## 2025-02-12 ENCOUNTER — APPOINTMENT (OUTPATIENT)
Dept: PRIMARY CARE | Facility: CLINIC | Age: 35
End: 2025-02-12
Payer: COMMERCIAL

## 2025-02-12 VITALS
SYSTOLIC BLOOD PRESSURE: 124 MMHG | BODY MASS INDEX: 24.38 KG/M2 | WEIGHT: 180 LBS | HEIGHT: 72 IN | DIASTOLIC BLOOD PRESSURE: 72 MMHG

## 2025-02-12 DIAGNOSIS — E13.9 OTHER SPECIFIED DIABETES MELLITUS WITHOUT COMPLICATION, WITHOUT LONG-TERM CURRENT USE OF INSULIN: ICD-10-CM

## 2025-02-12 DIAGNOSIS — M25.512 CHRONIC LEFT SHOULDER PAIN: ICD-10-CM

## 2025-02-12 DIAGNOSIS — E10.9 TYPE 1 DIABETES MELLITUS WITHOUT COMPLICATION: Primary | ICD-10-CM

## 2025-02-12 DIAGNOSIS — F13.20 MODERATE BENZODIAZEPINE USE DISORDER (MULTI): ICD-10-CM

## 2025-02-12 DIAGNOSIS — E13.319: ICD-10-CM

## 2025-02-12 DIAGNOSIS — F31.9 BIPOLAR I DISORDER (MULTI): ICD-10-CM

## 2025-02-12 DIAGNOSIS — R53.83 OTHER FATIGUE: ICD-10-CM

## 2025-02-12 DIAGNOSIS — G89.29 CHRONIC LEFT SHOULDER PAIN: ICD-10-CM

## 2025-02-12 DIAGNOSIS — Z13.220 LIPID SCREENING: ICD-10-CM

## 2025-02-12 PROCEDURE — 3008F BODY MASS INDEX DOCD: CPT | Performed by: INTERNAL MEDICINE

## 2025-02-12 PROCEDURE — 3078F DIAST BP <80 MM HG: CPT | Performed by: INTERNAL MEDICINE

## 2025-02-12 PROCEDURE — 99213 OFFICE O/P EST LOW 20 MIN: CPT | Performed by: INTERNAL MEDICINE

## 2025-02-12 PROCEDURE — 3074F SYST BP LT 130 MM HG: CPT | Performed by: INTERNAL MEDICINE

## 2025-02-12 RX ORDER — CYCLOBENZAPRINE HCL 10 MG
10 TABLET ORAL NIGHTLY PRN
Qty: 30 TABLET | Refills: 2 | Status: SHIPPED | OUTPATIENT
Start: 2025-02-12 | End: 2025-10-10

## 2025-02-12 RX ORDER — NABUMETONE 750 MG/1
750 TABLET, FILM COATED ORAL 2 TIMES DAILY
Qty: 60 TABLET | Refills: 2 | Status: SHIPPED | OUTPATIENT
Start: 2025-02-12 | End: 2026-02-12

## 2025-02-12 ASSESSMENT — ENCOUNTER SYMPTOMS
DEPRESSION: 0
LOSS OF SENSATION IN FEET: 0
OCCASIONAL FEELINGS OF UNSTEADINESS: 0

## 2025-02-12 NOTE — LETTER
February 12, 2025     Patient: Elmer Little Jr.   YOB: 1990   Date of Visit: 2/12/2025       To Whom It May Concern:    Elmer Little was seen in my clinic on 2/12/2025 at 5:15 pm. Please allow Elmer light duty no lifting over 5lbs from 02/12/2025 through 03/26/2025.     If you have any questions or concerns, please don't hesitate to call.         Sincerely,         Conchita Christianson MD

## 2025-02-13 ENCOUNTER — HOSPITAL ENCOUNTER (OUTPATIENT)
Dept: RADIOLOGY | Facility: CLINIC | Age: 35
Discharge: HOME | End: 2025-02-13
Payer: COMMERCIAL

## 2025-02-13 DIAGNOSIS — M25.512 CHRONIC LEFT SHOULDER PAIN: ICD-10-CM

## 2025-02-13 DIAGNOSIS — G89.29 CHRONIC LEFT SHOULDER PAIN: ICD-10-CM

## 2025-02-13 PROBLEM — E13.319: Status: ACTIVE | Noted: 2025-02-13

## 2025-02-13 PROBLEM — F13.20: Status: ACTIVE | Noted: 2025-02-13

## 2025-02-13 PROCEDURE — 73030 X-RAY EXAM OF SHOULDER: CPT | Mod: LT

## 2025-02-13 NOTE — PROGRESS NOTES
Subjective   Patient ID: Elmer Little Jr. is a 34 y.o. male who presents for Establish Care (/).    This is a 34-year-old white gentleman came to my office after years.  He has severe left shoulder pain.  Overhead movements painful.  He is a right-handed man.  It happened after he injured it several weeks ago.    CURRENT MEDICATIONS:  Humalog pump, he has a problem.  He wants different.  Insurance is not covering.  He sees Endocrine.    IMMUNIZATION:  Tetanus within the last 10 years.    I have personally reviewed the patient's Past Medical History, Medications, Allergies, Social History, and Family History in the EMR.    Review of Systems   All other systems reviewed and are negative.    Objective   /72   Ht 1.829 m (6')   Wt 81.6 kg (180 lb)   BMI 24.41 kg/m²     Physical Exam  Vitals reviewed.   HENT:      Right Ear: Tympanic membrane, ear canal and external ear normal.      Left Ear: Tympanic membrane, ear canal and external ear normal.   Eyes:      General: No scleral icterus.     Pupils: Pupils are equal, round, and reactive to light.   Neck:      Vascular: No carotid bruit.   Cardiovascular:      Heart sounds: Normal heart sounds, S1 normal and S2 normal. No murmur heard.     No friction rub.   Pulmonary:      Effort: Pulmonary effort is normal.      Breath sounds: Normal breath sounds and air entry.   Abdominal:      Palpations: There is no hepatomegaly, splenomegaly or mass.   Musculoskeletal:         General: No swelling or deformity. Normal range of motion.      Cervical back: Neck supple.      Right lower leg: No edema.      Left lower leg: No edema.      Comments: Left shoulder swelling and tenderness.  Movements painful.  Overhead movements painful.   Lymphadenopathy:      Cervical: No cervical adenopathy.      Upper Body:      Right upper body: No axillary adenopathy.      Left upper body: No axillary adenopathy.      Lower Body: No right inguinal adenopathy. No left inguinal adenopathy.    Neurological:      Mental Status: He is oriented to person, place, and time.      Cranial Nerves: Cranial nerves 2-12 are intact. No cranial nerve deficit.      Sensory: No sensory deficit.      Motor: Motor function is intact. No weakness.      Gait: Gait is intact.      Deep Tendon Reflexes: Reflexes normal.   Psychiatric:         Mood and Affect: Mood normal. Mood is not anxious or depressed. Affect is not angry.         Behavior: Behavior is not agitated.         Thought Content: Thought content normal.         Judgment: Judgment normal.     LAB WORK:  Laboratory testing discussed.    Assessment/Plan   Problem List Items Addressed This Visit             ICD-10-CM       Endocrine/Metabolic    Diabetes mellitus (Multi) - Primary E11.9    Relevant Orders    Hemoglobin A1C     Other Visit Diagnoses         Codes    Lipid screening     Z13.220    Relevant Orders    Comprehensive Metabolic Panel    Lipid Panel    Other fatigue     R53.83    Relevant Orders    CBC    Urinalysis with Reflex Microscopic    Thyroid Stimulating Hormone    Chronic left shoulder pain     M25.512, G89.29    Relevant Medications    nabumetone (Relafen) 750 mg tablet    cyclobenzaprine (Flexeril) 10 mg tablet    Other Relevant Orders    Referral to Orthopaedic Surgery    XR shoulder left 2+ views    Referral to Physical Therapy        1. Left shoulder pain, probably left capsular ligament injury.  X-ray of the left shoulder.  Might need MRI.  Relafen, Flexeril, therapy.  Refer to Dr. Almanzar for steroid shot.  2. Type 1 diabetic.  We will check hemoglobin A1c and blood work.  3. Pump problem.  I will refer him back to diabetologist.  4. Complete blood work ordered.  5. I shall see him back in a week after test or ASAP if needed.  Go for therapy.    Scribe Attestation  By signing my name below, IAllegra Scribe attest that this documentation has been prepared under the direction and in the presence of Conchita Christianson MD.     All medical  record entries made by the scribe were personally dictated by me I have reviewed the chart and agree the record accurately reflects my personal performance of his history physical examination and management

## 2025-02-28 ENCOUNTER — TELEPHONE (OUTPATIENT)
Dept: PRIMARY CARE | Facility: CLINIC | Age: 35
End: 2025-02-28
Payer: COMMERCIAL